# Patient Record
Sex: MALE | Race: WHITE | Employment: OTHER | URBAN - METROPOLITAN AREA
[De-identification: names, ages, dates, MRNs, and addresses within clinical notes are randomized per-mention and may not be internally consistent; named-entity substitution may affect disease eponyms.]

---

## 2017-02-28 ENCOUNTER — COMMUNICATION - HEALTHEAST (OUTPATIENT)
Dept: BEHAVIORAL HEALTH | Facility: CLINIC | Age: 57
End: 2017-02-28

## 2017-02-28 DIAGNOSIS — F06.30 MOOD DISORDER IN CONDITIONS CLASSIFIED ELSEWHERE: ICD-10-CM

## 2017-03-21 ENCOUNTER — OFFICE VISIT - HEALTHEAST (OUTPATIENT)
Dept: BEHAVIORAL HEALTH | Facility: CLINIC | Age: 57
End: 2017-03-21

## 2017-03-21 DIAGNOSIS — F60.9 PERSONALITY DISORDER (H): ICD-10-CM

## 2017-03-21 DIAGNOSIS — S09.90XS DEMENTIA DUE TO HEAD TRAUMA (H): ICD-10-CM

## 2017-03-21 DIAGNOSIS — F06.30 MOOD DISORDER DUE TO OLD HEAD TRAUMA: ICD-10-CM

## 2017-03-21 DIAGNOSIS — F31.9 BIPOLAR 1 DISORDER, DEPRESSED (H): ICD-10-CM

## 2017-03-21 DIAGNOSIS — F02.80 DEMENTIA DUE TO HEAD TRAUMA (H): ICD-10-CM

## 2017-03-21 DIAGNOSIS — S09.90XS MOOD DISORDER DUE TO OLD HEAD TRAUMA: ICD-10-CM

## 2017-08-08 ENCOUNTER — OFFICE VISIT - HEALTHEAST (OUTPATIENT)
Dept: BEHAVIORAL HEALTH | Facility: CLINIC | Age: 57
End: 2017-08-08

## 2017-08-08 DIAGNOSIS — F31.9 BIPOLAR 1 DISORDER, DEPRESSED (H): ICD-10-CM

## 2017-08-08 DIAGNOSIS — S09.90XS MOOD DISORDER DUE TO OLD HEAD TRAUMA: ICD-10-CM

## 2017-08-08 DIAGNOSIS — F06.30 MOOD DISORDER IN CONDITIONS CLASSIFIED ELSEWHERE: ICD-10-CM

## 2017-08-08 DIAGNOSIS — S09.90XS MOOD DISORDER DUE TO OLD HEAD INJURY: ICD-10-CM

## 2017-08-08 DIAGNOSIS — F06.30 MOOD DISORDER DUE TO OLD HEAD INJURY: ICD-10-CM

## 2017-08-08 DIAGNOSIS — S09.90XS DEMENTIA DUE TO HEAD TRAUMA (H): ICD-10-CM

## 2017-08-08 DIAGNOSIS — F60.9 PERSONALITY DISORDER (H): ICD-10-CM

## 2017-08-08 DIAGNOSIS — F06.30 MOOD DISORDER DUE TO OLD HEAD TRAUMA: ICD-10-CM

## 2017-08-08 DIAGNOSIS — F02.80 DEMENTIA DUE TO HEAD TRAUMA (H): ICD-10-CM

## 2017-09-26 ENCOUNTER — COMMUNICATION - HEALTHEAST (OUTPATIENT)
Dept: BEHAVIORAL HEALTH | Facility: CLINIC | Age: 57
End: 2017-09-26

## 2017-11-07 ENCOUNTER — COMMUNICATION - HEALTHEAST (OUTPATIENT)
Dept: BEHAVIORAL HEALTH | Facility: CLINIC | Age: 57
End: 2017-11-07

## 2017-11-29 ENCOUNTER — COMMUNICATION - HEALTHEAST (OUTPATIENT)
Dept: BEHAVIORAL HEALTH | Facility: CLINIC | Age: 57
End: 2017-11-29

## 2017-12-28 ENCOUNTER — COMMUNICATION - HEALTHEAST (OUTPATIENT)
Dept: BEHAVIORAL HEALTH | Facility: CLINIC | Age: 57
End: 2017-12-28

## 2017-12-28 DIAGNOSIS — F06.30 MOOD DISORDER IN CONDITIONS CLASSIFIED ELSEWHERE: ICD-10-CM

## 2018-02-06 ENCOUNTER — OFFICE VISIT - HEALTHEAST (OUTPATIENT)
Dept: BEHAVIORAL HEALTH | Facility: CLINIC | Age: 58
End: 2018-02-06

## 2018-02-13 ENCOUNTER — COMMUNICATION - HEALTHEAST (OUTPATIENT)
Dept: BEHAVIORAL HEALTH | Facility: CLINIC | Age: 58
End: 2018-02-13

## 2018-02-13 DIAGNOSIS — F06.30 MOOD DISORDER IN CONDITIONS CLASSIFIED ELSEWHERE: ICD-10-CM

## 2018-03-12 ENCOUNTER — COMMUNICATION - HEALTHEAST (OUTPATIENT)
Dept: BEHAVIORAL HEALTH | Facility: CLINIC | Age: 58
End: 2018-03-12

## 2018-03-13 ENCOUNTER — OFFICE VISIT - HEALTHEAST (OUTPATIENT)
Dept: BEHAVIORAL HEALTH | Facility: CLINIC | Age: 58
End: 2018-03-13

## 2018-03-13 DIAGNOSIS — F02.80 DEMENTIA DUE TO HEAD TRAUMA (H): ICD-10-CM

## 2018-03-13 DIAGNOSIS — F31.9 BIPOLAR 1 DISORDER, DEPRESSED (H): ICD-10-CM

## 2018-03-13 DIAGNOSIS — Z79.899 ENCOUNTER FOR LONG-TERM (CURRENT) USE OF MEDICATIONS: ICD-10-CM

## 2018-03-13 DIAGNOSIS — F06.30 MOOD DISORDER DUE TO OLD HEAD TRAUMA: ICD-10-CM

## 2018-03-13 DIAGNOSIS — F06.30 MOOD DISORDER IN CONDITIONS CLASSIFIED ELSEWHERE: ICD-10-CM

## 2018-03-13 DIAGNOSIS — S09.90XS MOOD DISORDER DUE TO OLD HEAD TRAUMA: ICD-10-CM

## 2018-03-13 DIAGNOSIS — S09.90XS DEMENTIA DUE TO HEAD TRAUMA (H): ICD-10-CM

## 2018-03-13 RX ORDER — POLYETHYLENE GLYCOL 3350 17 G/17G
17 POWDER, FOR SOLUTION ORAL DAILY
Status: SHIPPED | COMMUNITY
Start: 2018-03-13

## 2018-05-15 ENCOUNTER — COMMUNICATION - HEALTHEAST (OUTPATIENT)
Dept: BEHAVIORAL HEALTH | Facility: CLINIC | Age: 58
End: 2018-05-15

## 2018-05-15 DIAGNOSIS — F06.30 MOOD DISORDER IN CONDITIONS CLASSIFIED ELSEWHERE: ICD-10-CM

## 2018-05-22 ENCOUNTER — COMMUNICATION - HEALTHEAST (OUTPATIENT)
Dept: BEHAVIORAL HEALTH | Facility: CLINIC | Age: 58
End: 2018-05-22

## 2018-05-22 DIAGNOSIS — F06.30 MOOD DISORDER IN CONDITIONS CLASSIFIED ELSEWHERE: ICD-10-CM

## 2018-09-19 ENCOUNTER — COMMUNICATION - HEALTHEAST (OUTPATIENT)
Dept: BEHAVIORAL HEALTH | Facility: CLINIC | Age: 58
End: 2018-09-19

## 2018-09-19 DIAGNOSIS — F06.30 MOOD DISORDER IN CONDITIONS CLASSIFIED ELSEWHERE: ICD-10-CM

## 2018-09-19 DIAGNOSIS — S09.90XS MOOD DISORDER DUE TO OLD HEAD TRAUMA: ICD-10-CM

## 2018-09-19 DIAGNOSIS — F06.30 MOOD DISORDER DUE TO OLD HEAD TRAUMA: ICD-10-CM

## 2018-09-26 ENCOUNTER — OFFICE VISIT - HEALTHEAST (OUTPATIENT)
Dept: BEHAVIORAL HEALTH | Facility: CLINIC | Age: 58
End: 2018-09-26

## 2018-09-26 DIAGNOSIS — S09.90XS MOOD DISORDER DUE TO OLD HEAD TRAUMA: ICD-10-CM

## 2018-09-26 DIAGNOSIS — F06.30 MOOD DISORDER DUE TO OLD HEAD TRAUMA: ICD-10-CM

## 2018-09-26 DIAGNOSIS — F02.80 DEMENTIA DUE TO HEAD TRAUMA (H): ICD-10-CM

## 2018-09-26 DIAGNOSIS — F31.9 BIPOLAR 1 DISORDER, DEPRESSED (H): ICD-10-CM

## 2018-09-26 DIAGNOSIS — S09.90XS DEMENTIA DUE TO HEAD TRAUMA (H): ICD-10-CM

## 2018-10-29 ENCOUNTER — COMMUNICATION - HEALTHEAST (OUTPATIENT)
Dept: BEHAVIORAL HEALTH | Facility: CLINIC | Age: 58
End: 2018-10-29

## 2018-10-29 DIAGNOSIS — F06.30 MOOD DISORDER IN CONDITIONS CLASSIFIED ELSEWHERE: ICD-10-CM

## 2019-03-15 ENCOUNTER — COMMUNICATION - HEALTHEAST (OUTPATIENT)
Dept: BEHAVIORAL HEALTH | Facility: CLINIC | Age: 59
End: 2019-03-15

## 2019-03-15 DIAGNOSIS — F06.30 MOOD DISORDER DUE TO OLD HEAD TRAUMA: ICD-10-CM

## 2019-03-15 DIAGNOSIS — F06.30 MOOD DISORDER IN CONDITIONS CLASSIFIED ELSEWHERE: ICD-10-CM

## 2019-03-15 DIAGNOSIS — S09.90XS MOOD DISORDER DUE TO OLD HEAD TRAUMA: ICD-10-CM

## 2019-03-20 ENCOUNTER — OFFICE VISIT - HEALTHEAST (OUTPATIENT)
Dept: BEHAVIORAL HEALTH | Facility: CLINIC | Age: 59
End: 2019-03-20

## 2019-03-20 DIAGNOSIS — S09.90XS MOOD DISORDER DUE TO OLD HEAD TRAUMA: ICD-10-CM

## 2019-03-20 DIAGNOSIS — F06.30 MOOD DISORDER DUE TO OLD HEAD INJURY: ICD-10-CM

## 2019-03-20 DIAGNOSIS — F06.30 MOOD DISORDER IN CONDITIONS CLASSIFIED ELSEWHERE: ICD-10-CM

## 2019-03-20 DIAGNOSIS — F31.9 BIPOLAR 1 DISORDER, DEPRESSED (H): ICD-10-CM

## 2019-03-20 DIAGNOSIS — F06.30 MOOD DISORDER DUE TO OLD HEAD TRAUMA: ICD-10-CM

## 2019-03-20 DIAGNOSIS — S09.90XS MOOD DISORDER DUE TO OLD HEAD INJURY: ICD-10-CM

## 2019-04-04 ENCOUNTER — COMMUNICATION - HEALTHEAST (OUTPATIENT)
Dept: BEHAVIORAL HEALTH | Facility: CLINIC | Age: 59
End: 2019-04-04

## 2019-08-14 ENCOUNTER — OFFICE VISIT - HEALTHEAST (OUTPATIENT)
Dept: BEHAVIORAL HEALTH | Facility: CLINIC | Age: 59
End: 2019-08-14

## 2019-08-14 DIAGNOSIS — F06.30 MOOD DISORDER IN CONDITIONS CLASSIFIED ELSEWHERE: ICD-10-CM

## 2019-08-14 DIAGNOSIS — F06.30 MOOD DISORDER DUE TO OLD HEAD TRAUMA: ICD-10-CM

## 2019-08-14 DIAGNOSIS — F31.9 BIPOLAR 1 DISORDER, DEPRESSED (H): ICD-10-CM

## 2019-08-14 DIAGNOSIS — F60.9 PERSONALITY DISORDER (H): ICD-10-CM

## 2019-08-14 DIAGNOSIS — S09.90XS MOOD DISORDER DUE TO OLD HEAD TRAUMA: ICD-10-CM

## 2020-02-23 ENCOUNTER — COMMUNICATION - HEALTHEAST (OUTPATIENT)
Dept: BEHAVIORAL HEALTH | Facility: CLINIC | Age: 60
End: 2020-02-23

## 2020-02-23 DIAGNOSIS — F06.30 MOOD DISORDER IN CONDITIONS CLASSIFIED ELSEWHERE: ICD-10-CM

## 2020-02-24 ENCOUNTER — COMMUNICATION - HEALTHEAST (OUTPATIENT)
Dept: BEHAVIORAL HEALTH | Facility: CLINIC | Age: 60
End: 2020-02-24

## 2020-02-24 DIAGNOSIS — F06.30 MOOD DISORDER IN CONDITIONS CLASSIFIED ELSEWHERE: ICD-10-CM

## 2020-04-01 ENCOUNTER — OFFICE VISIT - HEALTHEAST (OUTPATIENT)
Dept: BEHAVIORAL HEALTH | Facility: CLINIC | Age: 60
End: 2020-04-01

## 2020-04-01 DIAGNOSIS — F06.30 MOOD DISORDER DUE TO OLD HEAD TRAUMA: ICD-10-CM

## 2020-04-01 DIAGNOSIS — F43.23 ADJUSTMENT DISORDER WITH MIXED ANXIETY AND DEPRESSED MOOD: ICD-10-CM

## 2020-04-01 DIAGNOSIS — S09.90XS MOOD DISORDER DUE TO OLD HEAD TRAUMA: ICD-10-CM

## 2020-04-01 DIAGNOSIS — S09.90XS DEMENTIA DUE TO HEAD TRAUMA WITH BEHAVIORAL DISTURBANCE (H): ICD-10-CM

## 2020-04-01 DIAGNOSIS — F02.818 DEMENTIA DUE TO HEAD TRAUMA WITH BEHAVIORAL DISTURBANCE (H): ICD-10-CM

## 2020-04-17 ENCOUNTER — COMMUNICATION - HEALTHEAST (OUTPATIENT)
Dept: BEHAVIORAL HEALTH | Facility: CLINIC | Age: 60
End: 2020-04-17

## 2020-04-17 DIAGNOSIS — F41.9 ANXIETY: ICD-10-CM

## 2020-04-27 ENCOUNTER — COMMUNICATION - HEALTHEAST (OUTPATIENT)
Dept: BEHAVIORAL HEALTH | Facility: CLINIC | Age: 60
End: 2020-04-27

## 2020-04-27 DIAGNOSIS — S09.90XS MOOD DISORDER DUE TO OLD HEAD TRAUMA: ICD-10-CM

## 2020-04-27 DIAGNOSIS — F06.30 MOOD DISORDER DUE TO OLD HEAD TRAUMA: ICD-10-CM

## 2020-06-30 ENCOUNTER — COMMUNICATION - HEALTHEAST (OUTPATIENT)
Dept: BEHAVIORAL HEALTH | Facility: CLINIC | Age: 60
End: 2020-06-30

## 2020-07-08 ENCOUNTER — COMMUNICATION - HEALTHEAST (OUTPATIENT)
Dept: BEHAVIORAL HEALTH | Facility: CLINIC | Age: 60
End: 2020-07-08

## 2020-08-18 ENCOUNTER — COMMUNICATION - HEALTHEAST (OUTPATIENT)
Dept: BEHAVIORAL HEALTH | Facility: CLINIC | Age: 60
End: 2020-08-18

## 2020-08-18 DIAGNOSIS — F06.30 MOOD DISORDER IN CONDITIONS CLASSIFIED ELSEWHERE: ICD-10-CM

## 2020-08-27 ENCOUNTER — COMMUNICATION - HEALTHEAST (OUTPATIENT)
Dept: BEHAVIORAL HEALTH | Facility: CLINIC | Age: 60
End: 2020-08-27

## 2020-08-27 DIAGNOSIS — F06.30 MOOD DISORDER IN CONDITIONS CLASSIFIED ELSEWHERE: ICD-10-CM

## 2020-09-15 ENCOUNTER — COMMUNICATION - HEALTHEAST (OUTPATIENT)
Dept: BEHAVIORAL HEALTH | Facility: CLINIC | Age: 60
End: 2020-09-15

## 2020-09-21 ENCOUNTER — COMMUNICATION - HEALTHEAST (OUTPATIENT)
Dept: BEHAVIORAL HEALTH | Facility: CLINIC | Age: 60
End: 2020-09-21

## 2020-09-30 ENCOUNTER — OFFICE VISIT - HEALTHEAST (OUTPATIENT)
Dept: BEHAVIORAL HEALTH | Facility: CLINIC | Age: 60
End: 2020-09-30

## 2020-09-30 DIAGNOSIS — S09.90XS MOOD DISORDER DUE TO OLD HEAD TRAUMA: ICD-10-CM

## 2020-09-30 DIAGNOSIS — S09.90XS DEMENTIA DUE TO HEAD TRAUMA WITH BEHAVIORAL DISTURBANCE (H): ICD-10-CM

## 2020-09-30 DIAGNOSIS — F06.30 MOOD DISORDER DUE TO OLD HEAD TRAUMA: ICD-10-CM

## 2020-09-30 DIAGNOSIS — F43.23 ADJUSTMENT DISORDER WITH MIXED ANXIETY AND DEPRESSED MOOD: ICD-10-CM

## 2020-09-30 DIAGNOSIS — F31.9 BIPOLAR 1 DISORDER, DEPRESSED (H): ICD-10-CM

## 2020-09-30 DIAGNOSIS — F02.818 DEMENTIA DUE TO HEAD TRAUMA WITH BEHAVIORAL DISTURBANCE (H): ICD-10-CM

## 2021-02-26 ENCOUNTER — COMMUNICATION - HEALTHEAST (OUTPATIENT)
Dept: BEHAVIORAL HEALTH | Facility: CLINIC | Age: 61
End: 2021-02-26

## 2021-02-26 DIAGNOSIS — F06.30 MOOD DISORDER IN CONDITIONS CLASSIFIED ELSEWHERE: ICD-10-CM

## 2021-03-01 RX ORDER — CLONAZEPAM 1 MG/1
TABLET ORAL
Qty: 60 TABLET | Refills: 5 | Status: SHIPPED | OUTPATIENT
Start: 2021-03-01 | End: 2021-09-09

## 2021-03-02 ENCOUNTER — COMMUNICATION - HEALTHEAST (OUTPATIENT)
Dept: BEHAVIORAL HEALTH | Facility: CLINIC | Age: 61
End: 2021-03-02

## 2021-03-09 ENCOUNTER — COMMUNICATION - HEALTHEAST (OUTPATIENT)
Dept: BEHAVIORAL HEALTH | Facility: CLINIC | Age: 61
End: 2021-03-09

## 2021-03-09 DIAGNOSIS — F06.30 MOOD DISORDER IN CONDITIONS CLASSIFIED ELSEWHERE: ICD-10-CM

## 2021-03-12 ENCOUNTER — COMMUNICATION - HEALTHEAST (OUTPATIENT)
Dept: BEHAVIORAL HEALTH | Facility: CLINIC | Age: 61
End: 2021-03-12

## 2021-03-12 RX ORDER — SERTRALINE HYDROCHLORIDE 100 MG/1
TABLET, FILM COATED ORAL
Qty: 45 TABLET | Refills: 11 | Status: SHIPPED | OUTPATIENT
Start: 2021-03-12 | End: 2022-02-18

## 2021-03-31 ENCOUNTER — OFFICE VISIT - HEALTHEAST (OUTPATIENT)
Dept: BEHAVIORAL HEALTH | Facility: CLINIC | Age: 61
End: 2021-03-31

## 2021-03-31 DIAGNOSIS — F41.9 ANXIETY: ICD-10-CM

## 2021-03-31 DIAGNOSIS — Z79.899 ENCOUNTER FOR LONG-TERM (CURRENT) USE OF MEDICATIONS: ICD-10-CM

## 2021-03-31 DIAGNOSIS — F06.30 MOOD DISORDER DUE TO OLD HEAD TRAUMA: ICD-10-CM

## 2021-03-31 DIAGNOSIS — S09.90XS MOOD DISORDER DUE TO OLD HEAD TRAUMA: ICD-10-CM

## 2021-03-31 RX ORDER — PROPRANOLOL HYDROCHLORIDE 20 MG/1
20 TABLET ORAL 3 TIMES DAILY
Qty: 270 TABLET | Refills: 3 | Status: SHIPPED | OUTPATIENT
Start: 2021-03-31

## 2021-03-31 RX ORDER — QUETIAPINE FUMARATE 100 MG/1
100 TABLET, FILM COATED ORAL 2 TIMES DAILY
Qty: 62 TABLET | Refills: 11 | Status: SHIPPED | OUTPATIENT
Start: 2021-03-31

## 2021-05-27 NOTE — TELEPHONE ENCOUNTER
RECEIVED PHYSICIAN'S ORDER FOR REVIEW AND SIGNATUREFROM [facility] - [facility] PROVIDER'S CLIN[facility] MAILBOX

## 2021-05-31 NOTE — PROGRESS NOTES
Correct pharmacy verified with patient and confirmed in snapshot? [x] yes []no    Charge captured ? [x] yes  [] no    Medications Phoned  to Pharmacy [] yes [x]no  Name of Pharmacist:  List Medications, including dose, quantity and instructions      Medication Prescriptions given to patient   [] yes  [x] no   List the name of the drug the prescription was written for.       Medications ordered this visit were e-scribed.  Verified by order class [x] yes  [] no  Klonopin and Zoloft  Medication changes or discontinuations were communicated to patient's pharmacy: [] yes  [x] no    UA collected [] yes  [x] no    [place] Prescription Monitoring Program Reviewed? [x] yes  [] no    Referrals were made to:  none  Future appointment was made: [x] yes  [] no  11/19/19  Dictation completed at time of chart check: [x] yes  [] no    I have checked the documentation for today s encounters and the above information has been reviewed and completed.
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
## 2021-05-31 NOTE — PATIENT INSTRUCTIONS - HE
1.Patient will take the medications as prescribed.  Zoloft 150 mg po ever am  Seroquel 100 mg 2 times daily  Klonopin 1 mg 2 times daily    2.Group home staff will call with any problems between 2 visits.  3.retirement staff will take the patient to the emergency room if not feeling safe , unable to function in the community, or if suicidal, homicidal or hearing voices or having paranoia.  4.Group home staff will watch his diet and exercise.  5.Group home staff will take the patient to see non psychiatric providers for non psychiatric disorders.  6.Next appointment in 3months.

## 2021-06-01 VITALS — HEIGHT: 72 IN

## 2021-06-02 VITALS — HEIGHT: 72 IN

## 2021-06-06 NOTE — TELEPHONE ENCOUNTER
Scheduling: please contact group home to schedule with Dr. Yeh at [facility]    Date of Last Office Visit: 8/14/2019  Date of Next Office Visit: None. Central scheduling attempting to contact pt  No shows since last visit: x 1  Cancellations since last visit: x 2 - both provider initiated  ED visits since last visit: none    Medication Zoloft 100 mg date last ordered: 8/14/2019  Qty: 45  Refills: 5    sertraline (ZOLOFT) 100 MG tablet 45 tablet 5 8/14/2019  No   Sig - Route: Take 1.5 tablets (150 mg total) by mouth daily. - Oral       Lapse in therapy greater than 7 days: appears yes  Medication refill request verified as identical to current order: Yes except asking for 11 RF  Result of Last DAM, VPA, Li+ Level, CBC, or Carbamazepine Level (at or since last visit): N/A        []Eligibility - not seen in last year    [x]Supervision - no future appointment    [x]Compliance: possible out of meds ; N/S x 1    []Verification - order discrepancy    []Controlled Medication    []90 - day supply request    [x]Other LPN pending medications    Current Medication list:    alendronate (FOSAMAX) 70 MG tablet Take 70 mg by mouth every 7 days. Take in the morning on an empty stomach with a full glass of water 30 minutes before food   bisacodyl 5 mg Tab Take 5 mg by mouth bedtime.   calcium-vitamin D (CALCIUM-VITAMIN D) 500 mg(1,250mg) -200 unit per tablet Take 1 tablet by mouth daily.   cholecalciferol, vitamin D3, 1,000 unit tablet Take 1,000 Units by mouth daily.   clonazePAM (KLONOPIN) 1 MG tablet Take 1 tablet (1 mg total) by mouth 2 (two) times a day.   dutasteride (AVODART) 0.5 mg capsule Take 0.5 mg by mouth bedtime.   hydrOXYzine (ATARAX) 25 MG tablet Take 25 mg by mouth 4 (four) times a day as needed for itching.   loperamide (IMODIUM A-D) 2 mg tablet Take 2 mg by mouth every 8 (eight) hours as needed for diarrhea.   polyethylene glycol (MIRALAX) 17 gram packet Take 17 g by mouth daily.   PROPRANOLOL HCL (PROPRANOLOL  ORAL) Take 20 mg by mouth 3 (three) times a day.    QUEtiapine (SEROQUEL) 100 MG tablet TAKE 1 TABLET BY MOUTH TWICE DAILY   sertraline (ZOLOFT) 100 MG tablet Take 1.5 tablets (150 mg total) by mouth daily.   simvastatin (ZOCOR) 20 MG tablet Take 20 mg by mouth bedtime.   ZINC OXIDE (BOUDREAUXS BUTT PASTE TOP) Apply 1 application topically every 4 (four) hours as needed (to rectal area).         Medication Plan of Care at last office visit with MD/CNP:    PLAN:  1.Patient will take the medications as prescribed.  Zoloft 150 mg po ever am  Seroquel 100 mg 2 times daily  Klonopin 1 mg 2 times daily    [place], [place], [place], and [place] : NA

## 2021-06-07 NOTE — TELEPHONE ENCOUNTER
Pharmacy requesting refill for Propranolol 20 mg three times a day. Ins is asking to convert this order to a 90 day supply. This med is listed as historical and no previous prescriptions found.   Phone call made to [facility] and spoke to Dr. Ruba Lucas approved this med on 5/1/2019 ( see Physician's order under media ) and according to staff pt is due for refill.   Last seen ( telephone visit) on 4/1/2020    Plan     1.Patient will take the medications as prescribed.  Zoloft 150 mg po ever am  Seroquel 100 mg 2 times daily  Klonopin 1 mg 2 times daily     Please review and prescribe if appropriate.

## 2021-06-07 NOTE — TELEPHONE ENCOUNTER
Already spoke to [facility] ( see previous note)  Order for Propranolol 20 mg three times a day pended for provider to review and sign.

## 2021-06-07 NOTE — PROGRESS NOTES
"Telephone Outpatient Psychiatric  Progress Note    Date of visit: 4/1/2020    Dominik Love is a 60 y.o. male who is being evaluated via a billable telephone visit.      The patient has been notified of following:     \"This telephone visit will be conducted via a call between you and your physician/provider. We have found that certain health care needs can be provided without the need for a physical exam.  This service lets us provide the care you need with a short phone conversation.  If a prescription is necessary we can send it directly to your pharmacy.  If lab work is needed we can place an order for that and you can then stop by our lab to have the test done at a later time.    If during the course of the call the physician/provider feels a telephone visit is not appropriate, you will not be charged for this service.\"     Patient has given verbal consent to a Telephone visit? yes    Dominik Love complains of fear of the virus     I have reviewed and updated the patient's Past Medical History, Social History, Family History and Medication List.    ALLERGIES  Adhesive tape-silicones; Other drug allergy (see comments); Quinolones; Septra [sulfamethoxazole-trimethoprim]; Sulfa (sulfonamide antibiotics); and Trimethoprim    Additional provider notes:This visit is conducted via [facility]one due to social distancing of barrera virus . Dominik with present with is  staff .He denies suicidal and homicidal ideas,He says he is afraid if the virus, even though he does not know much about it..   He has not left  house x3 weeks due to Corona virus .He went to his home for [place]. He does not get to Day Tx. It is closed.He actually gets along with other people.Staff finds a way to occupy their mind.  Sleep is reasonable. He wakes up and he falls asleep. . Appetite ok. Energy is reasonable. Concentration is at baseline.   He denies physical problems. No side effects of medication.His brother and his mother are involved in " his life.  He washes his hands.  He does not have fever, dry cough, headache or muscle ache.  Staff is closely monitoring for that.  Staff says that they are watching over the clients because if 1 would get sick all of them would be quarantined.    Mental status examination today:  General: cooperative  Alert and oriented: Disoriented in time, oriented to to self  Speech: Impaired articulation due to traumatic brain injury  Thought process: concrete  Thought content: denies  delusions and hallucinations   Associations: Connected  Suicidal thoughts: denies   Homicidal thoughts: denies  Affect: anxious   Mood: depressed   Intellectual functioning: Impaired due to TBI  Memory: Impaired due to TBI  Fund of knowledge: Impaired due to TBI  Attention  and concentration: At his baseline which is below normal limits   Gait: cannot assess because this is phone visit  Psychomotor activity: agitation in his voice  Muscles strength and tone: can not assess because this is phone visit, but from previous visit, there is atrophy of lower extremities due to lack of use.     InSight and judgment: Limited ,he has a guardian    Medication adherence: compliant  Medication side effects: absent  Information about medications: Side effects, benefits and alternative treatments discussed with group home staff     Psychotherapy: Basic supportive therapy about coping with corona virus     Education: Diet and exercise discussed with his group home staff and they will help the patient with that.      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 single white male resident of a group home.  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  His mother is his guardian.  He is struggling with coping with stress of corona virus. Staff is helping  They do not expect medication changes.  They will closely monitor him.  They will call if there is worsening of symptoms.  The group home staff will follow these recommendations:    1.Patient will take the medications as prescribed.  Zoloft 150 mg po ever am  Seroquel 100 mg 2 times daily  Klonopin 1 mg 2 times daily    2.Group home staff will call with any problems between 2 visits.  3.senior care staff will take the patient to the emergency room if not feeling safe , unable to function in the community, or if suicidal, homicidal or hearing voices or having paranoia.  4.Group home staff will watch his diet and exercise.  5.Group home staff will take the patient to see non psychiatric providers for non psychiatric disorders.  6.Next appointment in 3months.    Phone call duration:  24 minutes    Corrie Yeh MD

## 2021-06-07 NOTE — TELEPHONE ENCOUNTER
Date of Last Office Visit: 4/1/20  Date of Next Office Visit: 7/8/20  No shows since last visit: none  Cancellations since last visit: none  ED visits since last visit:  none  Medication QUEtiapine (SEROQUEL) 100 MG tablet  date last ordered: 3/15/19  Qty: 62  Refills: 11  Lapse in therapy greater than 7 days: yes  Medication refill request verified as identical to current order: yes  Result of Last DAM, VPA, Li+ Level, CBC, or Carbamazepine Level (at or since last visit): N/A     [] Medication refilled per [facility] M-1.   [x] Medication unable to be refilled by RN due to criteria not met as indicated below:     []Eligibility - not seen in last year    []Supervision - no future appointment    [x]Compliance     []Verification - order discrepancy    []Controlled Medication    []Medication not included in RN Protocol    []90 - day supply request    []Other   Current Medication list:   alendronate (FOSAMAX) 70 MG tablet  Take 70 mg by mouth every 7 days. Take in the morning on an empty stomach with a full glass of water 30 minutes before food    bisacodyl 5 mg Tab  Take 5 mg by mouth bedtime.    calcium-vitamin D (CALCIUM-VITAMIN D) 500 mg(1,250mg) -200 unit per tablet  Take 1 tablet by mouth daily.    cholecalciferol, vitamin D3, 1,000 unit tablet  Take 1,000 Units by mouth daily.    clonazePAM (KLONOPIN) 1 MG tablet  TAKE 1 TABLET BY MOUTH TWICE DAILY *6 TOTAL FILLS* *ORDER WHEN LOW*    dutasteride (AVODART) 0.5 mg capsule  Take 0.5 mg by mouth bedtime.    hydrOXYzine (ATARAX) 25 MG tablet  Take 25 mg by mouth 4 (four) times a day as needed for itching.    loperamide (IMODIUM A-D) 2 mg tablet  Take 2 mg by mouth every 8 (eight) hours as needed for diarrhea.    polyethylene glycol (MIRALAX) 17 gram packet  Take 17 g by mouth daily.    propranoloL (INDERAL) 20 MG tablet  Take 1 tablet (20 mg total) by mouth 3 (three) times a day.    PROPRANOLOL HCL (PROPRANOLOL ORAL)  Take 20 mg by mouth 3 (three) times a day.    QUEtiapine  (SEROQUEL) 100 MG tablet  TAKE 1 TABLET BY MOUTH TWICE DAILY    sertraline (ZOLOFT) 100 MG tablet  TAKE ONE AND ONE-HALF TABLETS (150MG) BY MOUTH ONCE DAILY    simvastatin (ZOCOR) 20 MG tablet  Take 20 mg by mouth bedtime.    ZINC OXIDE (BOUDREAUXS BUTT PASTE TOP)  Apply 1 application topically every 4 (four) hours as needed (to rectal area).         Medication Plan of Care at last office visit with MD/CNP:    1.Patient will take the medications as prescribed.  Zoloft 150 mg po ever am  Seroquel 100 mg 2 times daily  Klonopin 1 mg 2 times daily

## 2021-06-09 NOTE — TELEPHONE ENCOUNTER
Group home called regarding clonazepan 1mg. Group home staff stated this medication was not given last night and wanted to let Dr. Yeh know a medication was missed.
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
## 2021-06-09 NOTE — PROGRESS NOTES
Correct pharmacy verified with patient and confirmed in snapshot? [x] yes []no    Medications Phoned  to Pharmacy [] yes [x]no  Name of Pharmacist:  List Medications, including dose, quantity and instructions      Medication Prescriptions given to patient   [] yes  [x] no   List the name of the drug the prescription was written for.       Medications ordered this visit were e-scribed.  Verified by order class [] yes  [x] no    Medication changes or discontinuations were communicated to patient's pharmacy: [] yes  [x] no    UA collected [] yes    [x] no    [place] Prescription Monitoring Program Reviewed? [] yes  [x] no    Referrals were made to: none    Future appointment was made: [x] yes  [] no    Dictation completed at time of chart check: [] yes  [x] no    I have checked the documentation for today s encounters and the above information has been reviewed and completed.

## 2021-06-10 NOTE — TELEPHONE ENCOUNTER
[facility] requesting Klonopin refills be ordered in quantity of 62 tabs for 31 days for their automated ordering sytem.

## 2021-06-10 NOTE — TELEPHONE ENCOUNTER
Date of Last Office Visit: 4/1/2020  Date of Next Office Visit: 9/15/2020  No shows since last visit: 1  Cancellations since last visit:none  ED visits since last visit: none    Medication Clonazepam 1 mg date last ordered:2/25/2020  Qty: 60  Refills:5    Lapse in therapy greater than 7 days: no  Medication refill request verified as identical to current order: yes  Result of Last DAM, VPA, Li+ Level, CBC, or Carbamazepine Level (at or since last visit): n/a     [] Medication refilled per [facility].   [x] Medication unable to be refilled by RN due to criteria not met as indicated below:     []Eligibility - not seen in last year    []Supervision - no future appointment    []Compliance     []Verification - order discrepancy    []Controlled Medication    [x]Medication not included in RN Protocol    []90 - day supply request    [x]Other Refills requested  Current Medication list:  Dominik Love    (MRN [id number])   Your Current Medications Are     alendronate (FOSAMAX) 70 MG tablet  Take 70 mg by mouth every 7 days. Take in the morning on an empty stomach with a full glass of water 30 minutes before food    bisacodyl 5 mg Tab  Take 5 mg by mouth bedtime.    calcium-vitamin D (CALCIUM-VITAMIN D) 500 mg(1,250mg) -200 unit per tablet  Take 1 tablet by mouth daily.    cholecalciferol, vitamin D3, 1,000 unit tablet  Take 1,000 Units by mouth daily.    clonazePAM (KLONOPIN) 1 MG tablet  TAKE 1 TABLET BY MOUTH TWICE DAILY *6 TOTAL FILLS* *ORDER WHEN LOW*    dutasteride (AVODART) 0.5 mg capsule  Take 0.5 mg by mouth bedtime.    hydrOXYzine (ATARAX) 25 MG tablet  Take 25 mg by mouth 4 (four) times a day as needed for itching.    loperamide (IMODIUM A-D) 2 mg tablet  Take 2 mg by mouth every 8 (eight) hours as needed for diarrhea.    polyethylene glycol (MIRALAX) 17 gram packet  Take 17 g by mouth daily.    propranoloL (INDERAL) 20 MG tablet  Take 1 tablet (20 mg total) by mouth 3 (three) times a day.    PROPRANOLOL HCL  (PROPRANOLOL ORAL)  Take 20 mg by mouth 3 (three) times a day.    QUEtiapine (SEROQUEL) 100 MG tablet  TAKE 1 TABLET BY MOUTH TWICE DAILY    sertraline (ZOLOFT) 100 MG tablet  TAKE ONE AND ONE-HALF TABLETS (150MG) BY MOUTH ONCE DAILY    simvastatin (ZOCOR) 20 MG tablet  Take 20 mg by mouth bedtime.    ZINC OXIDE (BOUDREAUXS BUTT PASTE TOP)  Apply 1 application topically every 4 (four) hours as needed (to rectal area).        Medication Plan of Care at last office visit with MD/CNP:   1.Patient will take the medications as prescribed.  Zoloft 150 mg po ever am  Seroquel 100 mg 2 times daily  Klonopin 1 mg 2 times daily     2.Group home staff will call with any problems between 2 visits.  3.correction staff will take the patient to the emergency room if not feeling safe , unable to function in the community, or if suicidal, homicidal or hearing voices or having paranoia.  4.Group home staff will watch his diet and exercise.  5.Group home staff will take the patient to see non psychiatric providers for non psychiatric disorders.  6.Next appointment in 3months.

## 2021-06-10 NOTE — TELEPHONE ENCOUNTER
Need Klonopin supply order to be ordered for 31 days instead of 30 so it can be placed as automatic    Pleased fax to [phone]
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
## 2021-06-11 NOTE — PATIENT INSTRUCTIONS - HE
1.Patient will take the medications as prescribed.  Zoloft 150 mg po ever am  Seroquel 100 mg 2 times daily  Klonopin 1 mg 2 times daily    2.Group home staff will call with any problems between 2 visits.  3.retirement staff will take the patient to the emergency room if not feeling safe , unable to function in the community, or if suicidal, homicidal or hearing voices or having paranoia.  4.Group home staff will watch his diet and exercise.  5.Group home staff will take the patient to see non psychiatric providers for non psychiatric disorders.  6.Next appointment in 6 months.

## 2021-06-11 NOTE — TELEPHONE ENCOUNTER
Attempted to reach group home twice, LM to return call to triage to discuss medication error, see how the patient is doing and if they are experiencing any adverse effects from the error. Message also forwarded to Dr. Yeh.

## 2021-06-11 NOTE — TELEPHONE ENCOUNTER
Gh called to report a medication error. Simvastatin 20mg dose, was given 2 tablets by accident equalling 40mg. Sertraline 100 mg, dose given was half the dose of this medication. GH wanted to report medication error and receive recommendations. No side effect reported.

## 2021-06-11 NOTE — TELEPHONE ENCOUNTER
Called Group home and left message for Anibal to return call to triage.     Please transfer patient to triage when he returns the call.

## 2021-06-11 NOTE — TELEPHONE ENCOUNTER
Called patient home. Spoke with staff Anibal who stated that patient is asleep and will be up in an hour. Rn spoke with provider and let her know what the situation was and provider stated that patient should reschedule appointment. RN gave clinic number to patient staff Aniabl and asked him to reschedule patient appointment per provider's direcive. He agreed.

## 2021-06-11 NOTE — TELEPHONE ENCOUNTER
RN spoke with Anibal patient home staff and informed him of Dr. Yeh's directives and he said the group home had already called patient pcp. Anibal explained that patient has no symptoms/side effects that they have noticed. He stated that patient is doing well.

## 2021-06-12 NOTE — PROGRESS NOTES
Pt is here for psychiatric med management follow up and is accompanied by group home staff. Pt says he wants to die today. Client is hard to understand , has dementia due to head trauma.    Correct pharmacy verified with patient and confirmed in snapshot? [x] yes []no    Medications Phoned  to Pharmacy [] yes [x]no  Name of Pharmacist:  List Medications, including dose, quantity and instructions      Medication Prescriptions given to patient   [] yes  [x] no   List the name of the drug the prescription was written for.       Medications ordered this visit were e-scribed.  Verified by order class [x] yes  [] no  Klonopin, Seroquel, and Zoloft   Medication changes or discontinuations were communicated to patient's pharmacy: [] yes  [x] no    UA collected [] yes    [x] no    [place] Prescription Monitoring Program Reviewed? [] yes  [x] no    Referrals were made to: none    Future appointment was made: [x] yes  [] no  11/7/17  Dictation completed at time of chart check: [x] yes  [] no    I have checked the documentation for today s encounters and the above information has been reviewed and completed.
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
## 2021-06-15 NOTE — TELEPHONE ENCOUNTER
Medication pended to provider as high priority, see refill encounter dated 3/9/21. Waiting for provider response.

## 2021-06-15 NOTE — TELEPHONE ENCOUNTER
Date of Last Office Visit: 9/30/20  Date of Next Office Visit: 3/31/21  No shows since last visit: 0  Cancellations since last visit: 0    Medication requested: clonazepam 1 mg Date last ordered: 8/31/20 Qty: 62 Refills: 5     Review of [place] ?: Yes  Medication last filled date: 1/28/21 Qty filled: 56  Other controlled substance on [place] ?: No  If yes, is this a new medication?: N/Aa  If yes, name of medication: N/A and date filled: N/A    Lapse in medication adherence greater than 5 days?: No  If yes, call patient and gather details: N/A  Medication refill request verified as identical to current order?: Yes  Result of Last DAM, VPA, Li+ Level, CBC, or Carbamazepine Level (at or since last visit): N/A    []Medication refilled per  Medication Refill in Ambulatory Care  policy.  [x]Medication unable to be refilled by RN due to criteria not met as indicated below:    []Eligibility - not seen in the last year   []Supervision - no future appointment   []Compliance - no shows, cancellations or lapse in therapy   []Verification - order discrepancy   [x]Controlled medication   []Medication not included in policy   []90-day supply request   []Other

## 2021-06-15 NOTE — TELEPHONE ENCOUNTER
Pharmacy calls to get prescription for a 31 day refill of clonazePAM (KLONOPIN) 1 MG tablet    Pharmacy is [facility] - [place], [place] - [address] [place] (Ph: [phone])

## 2021-06-15 NOTE — TELEPHONE ENCOUNTER
Reason for call:  Medication If this is a refill request, has the caller requested the refill from the pharmacy already?   Yes  Will the patient be using a [facility]? No  Name of the pharmacy and phone number for the current request: [facility] [phone]    Name of the medication requested: clonazepam    Other request: They are requesting a 31 day ok.  Phone number to reach patient:    Any pharmacist at [phone]      Best Time:  ASAP    Can we leave a detailed message on this number? Yes

## 2021-06-15 NOTE — TELEPHONE ENCOUNTER
Date of Last Office Visit: 9-30-20  Date of Next Office Visit: 3-31-21  No shows since last visit: 0  Cancellations since last visit: 0    Medication requested: zoloft  Date last ordered: 2-24-20 Qty: 45 Refills: 11  Lapse in medication adherence greater than 5 days?: no    Medication refill request verified as identical to current order?: yes  Result of Last DAM, VPA, Li+ Level, CBC, or Carbamazepine Level (at or since last visit): N/A    []Medication refilled per  Medication Refill in Ambulatory Care  policy.  [x]Medication unable to be refilled by RN due to criteria not met as indicated below:    []Eligibility - not seen in the last year   []Supervision - no future appointment   []Compliance - no shows, cancellations or lapse in therapy   []Verification - order discrepancy   []Controlled medication   []Medication not included in policy   []90-day supply request   [x]Other  They are requesting 11 refills.

## 2021-06-15 NOTE — TELEPHONE ENCOUNTER
Reason for call:  Received call from Harinder ([facility]), inquiring about the reason for the denial for the prescription of Sertraline.     Additional comments: Per Harinder, please call her at [phone], if Pharmacy is not available at Pharmacy     Phone number to reach:  [phone]      Best Time: Anytime     Can we leave a detailed message on this number? Yes
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
## 2021-06-16 NOTE — PROGRESS NOTES
________________________________________  Medications Phoned  to Pharmacy [] yes [x]no  Name of Pharmacist:  List Medications, including dose, quantity and instructions    Medications ordered this visit were e-scribed.  Verified by order class [x] yes  [] no  propranolol 20 mg; Seroquel 100 mg   Medication changes or discontinuations were communicated to patient's pharmacy: [] yes  [x] no    Dictation completed at time of chart check: [x] yes  [] no    I have checked the documentation for today s encounters and the above information has been reviewed and completed.

## 2021-06-16 NOTE — PATIENT INSTRUCTIONS - HE
1.Patient will take the medications as prescribed.  Zoloft 150 mg po ever am for mood  Seroquel 100 mg 2 times daily for mood  Klonopin 1 mg 2 times daily for anxiety and agitation  Propranolol 20 mg 3 times daily for anxiety    2.Group home staff will call with any problems between 2 visits.  3.care home staff will take the patient to the emergency room if not feeling safe , unable to function in the community, or if suicidal, homicidal or hearing voices or having paranoia.  4.Group home staff will watch his diet and exercise.  5.Group home staff will take the patient to see non psychiatric providers for non psychiatric disorders.  6.Next appointment in 6 months.  7.  Check fasting lipids and glucose for monitoring Seroquel

## 2021-06-16 NOTE — TELEPHONE ENCOUNTER
Telephone Encounter by Whitney Valle LPN at 2/24/2020 11:27 AM     Author: Whitney Valle LPN Service: -- Author Type: Licensed Nurse    Filed: 2/24/2020 11:36 AM Encounter Date: 2/24/2020 Status: Signed    : Whitney Valle LPN (Licensed Nurse)       Date of Last Office Visit: 8/14/2019  Date of Next Office Visit: None - routed to scheduling  No shows since last visit: x 1  Cancellations since last visit: x 2 - both provider initiated  ED visits since last visit: none    Medication Klonopin 1 mg date last ordered: 8/14/2019  Qty: 60  Refills: 5    clonazePAM (KLONOPIN) 1 MG tablet 60 tablet 5 8/14/2019     Sig - Route: Take 1 tablet (1 mg total) by mouth 2 (two) times a day. - Oral        Lapse in therapy greater than 7 days: No. According to  last filled on 1/27/2020 for 30 days  Medication refill request verified as identical to current order: yes  Result of Last DAM, VPA, Li+ Level, CBC, or Carbamazepine Level (at or since last visit): N/A        []Eligibility - not seen in last year    [x]Supervision - no future appointment    [x]Compliance : N/S x 1    []Verification - order discrepancy    [x]Controlled Medication    []90 - day supply request    [x]Other LPN pending medications    Current Medication list:    alendronate (FOSAMAX) 70 MG tablet Take 70 mg by mouth every 7 days. Take in the morning on an empty stomach with a full glass of water 30 minutes before food   bisacodyl 5 mg Tab Take 5 mg by mouth bedtime.   calcium-vitamin D (CALCIUM-VITAMIN D) 500 mg(1,250mg) -200 unit per tablet Take 1 tablet by mouth daily.   cholecalciferol, vitamin D3, 1,000 unit tablet Take 1,000 Units by mouth daily.   clonazePAM (KLONOPIN) 1 MG tablet Take 1 tablet (1 mg total) by mouth 2 (two) times a day.   dutasteride (AVODART) 0.5 mg capsule Take 0.5 mg by mouth bedtime.   hydrOXYzine (ATARAX) 25 MG tablet Take 25 mg by mouth 4 (four) times a day as needed for itching.   loperamide  (IMODIUM A-D) 2 mg tablet Take 2 mg by mouth every 8 (eight) hours as needed for diarrhea.   polyethylene glycol (MIRALAX) 17 gram packet Take 17 g by mouth daily.   PROPRANOLOL HCL (PROPRANOLOL ORAL) Take 20 mg by mouth 3 (three) times a day.    QUEtiapine (SEROQUEL) 100 MG tablet TAKE 1 TABLET BY MOUTH TWICE DAILY   sertraline (ZOLOFT) 100 MG tablet Take 1.5 tablets (150 mg total) by mouth daily.   simvastatin (ZOCOR) 20 MG tablet Take 20 mg by mouth bedtime.   ZINC OXIDE (BOUDREAUXS BUTT PASTE TOP) Apply 1 application topically every 4 (four) hours as needed (to rectal area).         Medication Plan of Care at last office visit with MD/CNP:    PLAN:    1.Patient will take the medications as prescribed.  Zoloft 150 mg po ever am  Seroquel 100 mg 2 times daily  Klonopin 1 mg 2 times daily    6.Next appointment in 3months.      [place], [place], [place], and [place] :Reviewed and no worrisome pharmacy activity noted
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
## 2021-06-16 NOTE — PROGRESS NOTES
Pt is here for routine psychiatric med management follow up. Staff reports no concerns. DISCUS performed.    Correct pharmacy verified with patient and confirmed in snapshot? [x] yes []no    Charge captured ? [x] yes  [] no    Medications Phoned  to Pharmacy [] yes [x]no  Name of Pharmacist:  List Medications, including dose, quantity and instructions      Medication Prescriptions given to patient   [] yes  [x] no   List the name of the drug the prescription was written for.       Medications ordered this visit were e-scribed.  Verified by order class [x] yes  [] no  Seroquel and Zoloft  Medication changes or discontinuations were communicated to patient's pharmacy: [] yes  [x] no    UA collected [] yes  [x] no    [place] Prescription Monitoring Program Reviewed? [] yes  [x] no    Referrals were made to:  none    Future appointment was made: [x] yes  [] no  9/11/18  Dictation completed at time of chart check: [x] yes  [] no    I have checked the documentation for today s encounters and the above information has been reviewed and completed.

## 2021-06-20 NOTE — PROGRESS NOTES
Patient here today for follow up of medication management.       [facility] [place] [facility]: 18  Query Report Page#: 1  Patient Rx History Report  JANIA GARVEY  Search Criteria: Last Name 'jania' and First Name 'graciela' and  = ' and Request Period = ' to  ' - 2 out of 2 Recipients Selected.  Fill Date Product, Str, Form Qty Days Pt ID Prescriber Written RX# N/R* Pharm **MED+  ---------- -------------------------------- ------------ ---- --------- ---------- ---------- ------------ ----- --------- ------  2018 CLONAZEPAM 1 MG TABLET 60.665868 30 [id number] 2018 [id number] R [id number] 00.0  2018 CLONAZEPAM 1 MG TABLET 62.539649 31 [id number] 2018 [id number] R [id number] 00.0  2018 CLONAZEPAM 1 MG TABLET 62.048616 31 [id number] 2018 [id number] R [id number] 00.0  2018 CLONAZEPAM 1 MG TABLET 62.150455 31 [id number] 2018 [id number] R [id number] 00.0  2018 CLONAZEPAM 1 MG TABLET 60.573543 30 [id number] 2018 [id number] N [id number] 00.0  2018 CLONAZEPAM 1 MG TABLET 60.930218 30 [id number] 2018 [id number] N [id number] 00.0  2018 CLONAZEPAM 1 MG TABLET 47.472983 24 [id number] 2018 [id number] N [id number] 00.0  2018 CLONAZEPAM 1 MG TABLET 60.689287 30 [id number] 2018 [id number] R [id number] [facility]  2018 CLONAZEPAM 1 MG TABLET 60.755610 30 [id number] 2018 [id number] R [id number] [facility]  2018 CLONAZEPAM 1 MG TABLET 62.418593 31 [id number] 2018 [id number] N [id number] [facility]  2018 CLONAZEPAM 1 MG TABLET 62.330182 31 [id number] 2018 [id number] N [id number] [facility]  2018 CLONAZEPAM 1 MG TABLET 59.462664 30 [id number] 2018 [id number] N [id number] [facility]  01/15/2018 CLONAZEPAM 1 MG TABLET 61.135151 31 [id number] 2017 [id number] N [id number] [facility]  2017 CLONAZEPAM 1 MG TABLET 62.797426 31  [id number] 2017 [id number] R [id number] K  2017 CLONAZEPAM 1 MG TABLET 19.336831 9 [id number] 2017 [id number] R [id number] K  2017 CLONAZEPAM 1 MG TABLET 62.738099 31 [id number] 2017 [id number] R [id number] K  10/27/2017 CLONAZEPAM 1 MG TABLET 60.947099 30 [id number] 2017 [id number] R [id number] K  2017 CLONAZEPAM 1 MG TABLET 62.435084 31 [id number] 2017 [id number] R [id number] [facility]  *N/R N=New R=Refill  +MED Daily  Prescribers for prescriptions listed  ----------------------------------------------------------------------------------------------------------------------------------  [id number] NANDA FELDER MD; [address], [facility], [place] [address]  [id number] KENRICK SHAW MD; [address] [facility], , [facility] [address]  Pharmacies that dispensed prescriptions listed  ----------------------------------------------------------------------------------------------------------------------------------  [id number] [facility]; [address] [facility] [address],  Patients that match search criteria  ----------------------------------------------------------------------------------------------------------------------------------  [id number] PATRIC GARVEY,  64; [address] [facility] [address]  [id number] PATRIC GARVEY,  64; [facility] [address]  MED Summary  This section displays cumulative MED values by unique recipient. The MED Max value is the maximum occurrence of cumulative MED  **Per CDC guidance, the conversion factors and associated daily morphine milligram equivalents for drugs prescribed as part of  medication-assisted treatment for opioid use disorder should not be used to benchmark against dosage thresholds meant for opioids  prescribed for pain.  Report Disclaimers:  The report provided above is based upon the search criteria and the data  provided by the dispensing entities. For more information  about any prescription, please contact the dispenser or the prescriber.  This report contains confidential information, including patient identifiers, and is not a public record. The information on this  report must be treated as protected health information and is to be disclosed to others only as authorized by applicable state  and Federal regulations.  [facility] [place] Date: 09/26/18  Query Report Page#: 2  Patient Rx History Report  PATRIC GARVEY  sustained for any 3 consecutive days. This value is calculated based on prescriptions dispensed during the date range requested.  -----------------------------------------------------------------------------------------------------------------------------------  0 SPIVEY, MARE; 1960; [address], [facility] [address]  **Per CDC guidance, the conversion factors and associated daily morphine milligram equivalents for drugs prescribed as part of  medication-assisted treatment for opioid use disorder should not be used to benchmark against dosage thresholds meant for opioids  prescribed for pain.  Report Disclaimers:  The report provided above is based upon the search criteria and the data provided by the dispensing entities. For more information  about any prescription, please contact the dispenser or the prescriber.  This report contains confidential information, including patient identifiers, and is not a public record. The information on this  report must be treated as protected health information and is to be disclosed to others only as authorized by applicable state  and Federal regulations.    Correct pharmacy verified with patient and confirmed in snapshot? [x] yes []no    Charge captured ? [x] yes  [] no    Medications Phoned  to Pharmacy [] yes [x]no  Name of Pharmacist:  List Medications, including dose, quantity and instructions      Medication Prescriptions given to patient   []  yes  [x] no   List the name of the drug the prescription was written for.       Medications ordered this visit were e-scribed.  Verified by order class [] yes  [x] no    Medication changes or discontinuations were communicated to patient's pharmacy: [] yes  [x] no    UA collected [] yes  [x] no    [place] Prescription Monitoring Program Reviewed? [x] yes  [] no    Referrals were made to:      Future appointment was made: [x] yes  [] no    Dictation completed at time of chart check: [] yes  [x] no    I have checked the documentation for today s encounters and the above information has been reviewed and completed.
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
## 2021-06-25 NOTE — TELEPHONE ENCOUNTER
Date of Last Office Visit: 9/26/18  Date of Next Office Visit: 3/20/19  No shows since last visit: none  Cancellations since last visit: none  ED visits since last visit:  none  Medication quetiapine 100mg date last ordered: 9/21/18  Qty: 60  Refills: 5  Medication sertraline 100mg date last ordered: 9/21/18  Qty: 45  Refills: 5    Lapse in therapy greater than 7 days: No  Medication refill request verified as identical to current order: yes  Result of Last DAM, VPA, Li+ Level, CBC, or Carbamazepine Level (at or since last visit): N/A     [] Medication refilled per [facility] M[facility].   [] Medication unable to be refilled by RN due to criteria not met as indicated below:     []Eligibility - not seen in last year    []Supervision - no future appointment    []Compliance     []Verification - order discrepancy    []Controlled Medication    []Medication not included in RN Protocol    []90 - day supply request    [x]Other - Patient requesting refill early   Current Medication list:    alendronate (FOSAMAX) 70 MG tablet Take 70 mg by mouth every 7 days. Take in the morning on an empty stomach with a full glass of water 30 minutes before food   bisacodyl 5 mg Tab Take 5 mg by mouth bedtime.   calcium-vitamin D (CALCIUM-VITAMIN D) 500 mg(1,250mg) -200 unit per tablet Take 1 tablet by mouth daily.   cholecalciferol, vitamin D3, 1,000 unit tablet Take 1,000 Units by mouth daily.   clonazePAM (KLONOPIN) 1 MG tablet TAKE 1 TABLET BY MOUTH TWICE DAILY (6 TOTAL FILLS)   dutasteride (AVODART) 0.5 mg capsule Take 0.5 mg by mouth bedtime.   hydrOXYzine (ATARAX) 25 MG tablet Take 25 mg by mouth 4 (four) times a day as needed for itching.   loperamide (IMODIUM A-D) 2 mg tablet Take 2 mg by mouth every 8 (eight) hours as needed for diarrhea.   polyethylene glycol (MIRALAX) 17 gram packet Take 17 g by mouth daily.   PROPRANOLOL HCL (PROPRANOLOL ORAL) Take 20 mg by mouth 3 (three) times a day.    QUEtiapine (SEROQUEL) 100 MG tablet TAKE 1 TABLET BY  MOUTH TWICE DAILY.   sertraline (ZOLOFT) 100 MG tablet Take 1.5 tablets (150 mg total) by mouth daily.   simvastatin (ZOCOR) 20 MG tablet Take 20 mg by mouth bedtime.   ZINC OXIDE (BOUDREAUXS BUTT PASTE TOP) Apply 1 application topically every 4 (four) hours as needed (to rectal area).       Medication Plan of Care at last office visit with MD/CNP:    1.Patient will take the medications as prescribed.  Zoloft 150 mg po ever am  Seroquel 100 mg 2 times daily  Klonopin 1 mg 2 times daily

## 2021-06-25 NOTE — PATIENT INSTRUCTIONS - HE
1.Patient will take the medications as prescribed.  Zoloft 150 mg po ever am  Seroquel 100 mg 2 times daily  Klonopin 1 mg 2 times daily    2.Group home staff will call with any problems between 2 visits.  3.care home staff will take the patient to the emergency room if not feeling safe , unable to function in the community, or if suicidal, homicidal or hearing voices or having paranoia.  4.Group home staff will watch his diet and exercise.  5.Group home staff will take the patient to see non psychiatric providers for non psychiatric disorders.  6.Next appointment in 3 months.  7. Staff will take the patient to see PMD tomorrow for respiratory infection.

## 2021-06-25 NOTE — PROGRESS NOTES
Patient here today for follow up of medication management. Staff states patient is doing okay, states still has some aggression that comes and goes and forgetting a lot.

## 2021-06-25 NOTE — PROGRESS NOTES
Correct pharmacy verified with patient and confirmed in snapshot? [x] yes []no    Charge captured ? [x] yes  [] no    Medications Phoned  to Pharmacy [] yes [x]no  Name of Pharmacist:  List Medications, including dose, quantity and instructions      Medication Prescriptions given to patient   [] yes  [x] no   List the name of the drug the prescription was written for.       Medications ordered this visit were e-scribed.  Verified by order class [x] yes  [] no  Klonopin 1 mg    Medication changes or discontinuations were communicated to patient's pharmacy: [] yes  [x] no    UA collected [] yes  [x] no    [place] Prescription Monitoring Program Reviewed? [] yes  [x] no    Referrals were made to: none     Future appointment was made: [x] yes  [] no    Dictation completed at time of chart check: [x] yes  [] no    I have checked the documentation for today s encounters and the above information has been reviewed and completed.

## 2021-06-27 NOTE — PROGRESS NOTES
Progress Notes by Whitney Valle LPN at 8/14/2019 10:45 AM     Author: Whitney Valle LPN Service: -- Author Type: Licensed Nurse    Filed: 8/14/2019 11:57 AM Encounter Date: 8/14/2019 Status: Addendum    : Whitney Valle LPN (Licensed Nurse)    Related Notes: Original Note by Whitney Valle LPN (Licensed Nurse) filed at 8/14/2019 11:17 AM       Pt is here for routine follow up and accompanied by  staff. No new concerns reported today

## 2021-06-28 NOTE — PROGRESS NOTES
Progress Notes by Whitney Valle LPN at 4/1/2020 10:45 AM     Author: Whitney Valle LPN Service: -- Author Type: Licensed Nurse    Filed: 4/6/2020 10:33 AM Encounter Date: 4/1/2020 Status: Addendum    : Whitney Valle LPN (Licensed Nurse)    Related Notes: Original Note by Whitney Valle LPN (Licensed Nurse) filed at 4/1/2020  7:58 PM       Telephone visit        Medications Phoned  to Pharmacy [] yes [x]no  Name of Pharmacist:  List Medications, including dose, quantity and instructions    Medications ordered this visit were e-scribed.  Verified by order class [x] yes  [] no    Medication changes or discontinuations were communicated to patient's pharmacy: [] yes  [x] no    Future appointment was made: [] yes  [x] No  [facility] will contact the pt  Dictation completed at time of chart check: [x] yes  [] no    I have checked the documentation for todays encounters and the above information has been reviewed and completed.

## 2021-06-29 NOTE — PROGRESS NOTES
Progress Notes by Jacey Sun CMA at 9/30/2020 10:45 AM     Author: Jacey Sun CMA Service: -- Author Type: Certified Medical Assistant    Filed: 10/7/2020  2:09 PM Encounter Date: 9/30/2020 Status: Signed    : Jacey Sun CMA (Certified Medical Assistant)       This video/telephone visit will be conducted via a call between you and your physician/provider. We have found that certain health care needs can be provided without the need for an in-person physical exam. This service lets us provide the care you need with a video /telephone conversation. If a prescription is necessary we can send it directly to your pharmacy. If lab work is needed we can place an order for that and you can then stop by our lab to have the test done at a later time.   Just as we bill insurance for in-person visits, we also bill insurance for video/telephone visits. If you have questions about your insurance coverage, we recommend that you speak with your insurance company.   Patient has given verbal consent for video/Telephone visit? Yes (Per Staff [facility]ians are aware of this visit.)   Patient would like telephone visit please call: [phone]  Jacey EDGAR CMA   Per Staff Pt is ready for visit.     Staff verified patient's allergies, medications and pharmacy via phone.  Staff states patient is ready for visit.  [place]  was [place] prior to seeing patient today. See below for embedded report.

## 2021-06-30 NOTE — PROGRESS NOTES
Progress Notes by Jacey Sun CMA at 3/31/2021 10:15 AM     Author: Jacey Sun CMA Service: -- Author Type: Certified Medical Assistant    Filed: 3/31/2021 10:36 AM Encounter Date: 3/31/2021 Status: Signed    : Jacey Sun CMA (Certified Medical Assistant)       This video/telephone visit will be conducted via a call between you and your physician/provider. We have found that certain health care needs can be provided without the need for an in-person physical exam. This service lets us provide the care you need with a video /telephone conversation. If a prescription is necessary we can send it directly to your pharmacy. If lab work is needed we can place an order for that and you can then stop by our lab to have the test done at a later time.   Just as we bill insurance for in-person visits, we also bill insurance for video/telephone visits. If you have questions about your insurance coverage, we recommend that you speak with your insurance company.   Patient has given verbal consent for video/Telephone visit? Yes   Patient would like telephone visit please call: [phone]  Staff Anibal Hayes is aware that provider is running behind this morning. Will call back in no provider call by 10:30 am.     LEONCIO/FALLON EDGAR CMA   Medication refills are pended.   Patient verified allergies, medications and pharmacy via phone.  Patient states he is ready for visit.  [place]  was [place] prior to seeing patient today. See below for embedded report.

## 2021-07-04 NOTE — ADDENDUM NOTE
Addendum Note by Nanda Felder MD at 3/12/2021  1:09 PM     Author: Nanda Felder MD Service: -- Author Type: Physician    Filed: 3/12/2021  1:09 PM Encounter Date: 3/9/2021 Status: Signed    : Nanda Felder MD (Physician)    Addended by: NANDA FELDER on: 3/12/2021 01:09 PM        Modules accepted: Orders

## 2021-07-04 NOTE — ADDENDUM NOTE
Addendum Note by Lori Hernandez, RN at 3/12/2021  9:28 AM     Author: Lori Hernandez RN Service: Behavioral Author Type: Registered Nurse    Filed: 3/12/2021  9:28 AM Encounter Date: 3/9/2021 Status: Signed    : Lori Hernandez RN (Registered Nurse)    Addended by: LORI HERNANDEZ on: 3/12/2021 09:28 AM        Modules accepted: Orders

## 2021-09-09 DIAGNOSIS — F06.30 MOOD DISORDER IN CONDITIONS CLASSIFIED ELSEWHERE: ICD-10-CM

## 2021-09-09 RX ORDER — CLONAZEPAM 1 MG/1
TABLET ORAL
Qty: 62 TABLET | Refills: 5 | Status: SHIPPED | OUTPATIENT
Start: 2021-09-09 | End: 2022-02-18

## 2021-09-09 NOTE — TELEPHONE ENCOUNTER
Date of Last Office Visit: 3/31/21  Date of Next Office Visit: 9/28/21  No shows since last visit: none  Cancellations since last visit: none    Medication requested: Clonazepam  Date last ordered: 3/1/21 Qty: 60 Refills: 5     [place] of [place] ?: yes  Medication last filled date: 8/6/21 Qty filled: 60  Other controlled substance on [place] ?: no  If yes, is this a new medication?: NA  If yes, name of medication: NA and date filled: NA    Lapse in medication adherence greater than 5 days?: no  If yes, call patient and gather details: NA  Medication refill request verified as identical to current order?: yes  Result of Last DAM, VPA, Li+ Level, CBC, or Carbamazepine Level (at or since last visit): N/A    []Medication refilled per  Medication Refill in Ambulatory Care  policy.  [x]Medication unable to be refilled by RN due to criteria not met as indicated below:    []Eligibility - not seen in the last year   []Supervision - no future appointment   []Compliance - no shows, cancellations or lapse in therapy   []Verification - order discrepancy   [x]Controlled medication   []Medication not included in policy   []90-day supply request   [x]Other (multiple month) request

## 2021-09-28 ENCOUNTER — OFFICE VISIT (OUTPATIENT)
Dept: BEHAVIORAL HEALTH | Facility: CLINIC | Age: 61
End: 2021-09-28
Payer: MEDICARE

## 2021-09-28 VITALS — HEART RATE: 61 BPM | DIASTOLIC BLOOD PRESSURE: 73 MMHG | SYSTOLIC BLOOD PRESSURE: 115 MMHG

## 2021-09-28 DIAGNOSIS — F06.30 MOOD DISORDER DUE TO OLD HEAD TRAUMA: Primary | ICD-10-CM

## 2021-09-28 DIAGNOSIS — F02.818 DEMENTIA DUE TO HEAD TRAUMA WITH BEHAVIORAL DISTURBANCE (H): ICD-10-CM

## 2021-09-28 DIAGNOSIS — Z79.899 ENCOUNTER FOR LONG-TERM (CURRENT) USE OF MEDICATIONS: ICD-10-CM

## 2021-09-28 DIAGNOSIS — F31.9 BIPOLAR 1 DISORDER, DEPRESSED (H): ICD-10-CM

## 2021-09-28 DIAGNOSIS — F60.9 PERSONALITY DISORDER (H): ICD-10-CM

## 2021-09-28 DIAGNOSIS — S09.90XS MOOD DISORDER DUE TO OLD HEAD TRAUMA: Primary | ICD-10-CM

## 2021-09-28 DIAGNOSIS — S09.90XS DEMENTIA DUE TO HEAD TRAUMA WITH BEHAVIORAL DISTURBANCE (H): ICD-10-CM

## 2021-09-28 PROCEDURE — 99207 PR CDG-MDM COMPONENT: MEETS MODERATE - UP CODED: CPT | Performed by: PSYCHIATRY & NEUROLOGY

## 2021-09-28 PROCEDURE — 99214 OFFICE O/P EST MOD 30 MIN: CPT | Performed by: PSYCHIATRY & NEUROLOGY

## 2021-09-28 PROCEDURE — G0463 HOSPITAL OUTPT CLINIC VISIT: HCPCS

## 2021-09-28 NOTE — PATIENT INSTRUCTIONS
1.Patient will take the medications as prescribed.   Zoloft 150 mg po ever am for mood  Seroquel 100 mg 2 times daily for mood  Klonopin 1 mg 2 times daily for anxiety and agitation  Propranolol 20 mg 3 times daily for anxiety  Patient will not stop taking medications or adjust them without consulting with the provider.  2.Group home staff will call with any problems between 2 visits.  3.residential staff will take the patient to the emergency room if not feeling safe , unable to function in the community, or if suicidal, homicidal or hearing voices or having paranoia.  4.Group home staff will watch his diet and exercise.  5.Group home staff will take the patient to see non psychiatric providers for non psychiatric disorders.  6.Next appointment in 6 months.  7.  Check fasting lipids and glucose for monitoring Seroquel
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
## 2021-09-28 NOTE — NURSING NOTE
Pt is here for routine follow up. Staff reports no change DISCUS score is a 0 today    Correct pharmacy verified with patient and confirmed in snapshot? [x] yes []no    Charge captured ? [x] yes  [] no    Medications Phoned  to Pharmacy [] yes [x]no  Name of Pharmacist:  List Medications, including dose, quantity and instructions      Medication Prescriptions given to patient   [] yes  [x] no   List the name of the drug the prescription was written for.       Medications ordered this visit were e-scribed.  Verified by order class [] yes  [x] no    Medication changes or discontinuations were communicated to patient's pharmacy: [] yes  [x] no    UA collected [] yes  [x] no    [place] Prescription Monitoring Program Reviewed? [] yes  [x] no    Referrals were made to:  none  Future appointment was made: [] yes  [x] no    Dictation completed at time of chart check: [] yes  [x] no    I have checked the documentation for today s encounters and the above information has been reviewed and completed.

## 2021-10-05 ENCOUNTER — TRANSFERRED RECORDS (OUTPATIENT)
Dept: HEALTH INFORMATION MANAGEMENT | Facility: CLINIC | Age: 61
End: 2021-10-05
Payer: MEDICARE

## 2021-10-11 ENCOUNTER — TELEPHONE (OUTPATIENT)
Dept: BEHAVIORAL HEALTH | Facility: HOSPITAL | Age: 61
End: 2021-10-11
Payer: MEDICARE

## 2021-10-29 ENCOUNTER — TELEPHONE (OUTPATIENT)
Dept: BEHAVIORAL HEALTH | Facility: HOSPITAL | Age: 61
End: 2021-10-29

## 2022-01-03 NOTE — TELEPHONE ENCOUNTER
As per provider's directive, lab reports faxed to [facility] to set up an appt with PCP to follow up on increased glucose level.

## 2022-02-14 ENCOUNTER — TELEPHONE (OUTPATIENT)
Dept: BEHAVIORAL HEALTH | Facility: CLINIC | Age: 62
End: 2022-02-14
Payer: MEDICARE

## 2022-02-14 NOTE — TELEPHONE ENCOUNTER
[facility] ([facility] (TC):     NURSING Bridging Transfer of care from Dr. Yeh to new longitudinal provider  _________________________________    This RN has consulted with provider & this Medication Management referral to the Transition Clinic is deemed   [x] Appropriate  [] Inappropriate     Based on the following additional information gathered: Pt of Dr. Yeh transferring to long-term provider, bridging care until new provider; was scheduled w/ Dr. Yeh 3/29/22 at 11:45am; will likely need refill on/before 3/12/22    Contact w/ patient: This RN called pt, spoke to caregiver, reviewed provider leaving clinic and they verbalized understanding. We scheduled for them to be seen in the Transition Clinic w/ YENNY Espinal on 3/29/22 at 10:30am in-person. For long-term outpt psych appt schedule at check-out. Wtr reviewed location of clinic. Wtr routed to [facility] to facilitate scheduling.    Zeina Lozano RN on February 14, 2022 at 1:08 PM

## 2022-04-11 ENCOUNTER — TELEPHONE (OUTPATIENT)
Dept: PSYCHIATRY | Facility: CLINIC | Age: 62
End: 2022-04-11
Payer: MEDICARE

## 2022-04-11 NOTE — TELEPHONE ENCOUNTER
Reason for Call:   medication refill:    Do you use a [facility]?  Name of the pharmacy and phone number for the current request:  [facility]    Name of the medication requested: Clonozapam    Other request:  called stating that Dr. Rosales would be the one doing refills of patients Clonazepam. Pt previous Dr. Yeh patient, but no information found in chart regarding Ileana Rosales taking over medication regamine.     Can we leave a detailed message on this number? YES    Phone number patient can be reached at: Home number on file [phone] (home) or Cell number on file:    No relevant phone numbers on file.       Best Time: any    Call taken on 4/11/2022 at 9:10 AM by Laron Pierre

## 2022-04-11 NOTE — TELEPHONE ENCOUNTER
Received call from Jacek Kennedy () regarding getting a Clonazepam prescription refilled.  Patient had followed with Dr. Yeh in the past. Patient had a [facility] appt on 3/29/22 with LINO Espinal, however patient did not attend this visit.  Jacek was asking if Dr. Yeh or Ileana Rosales could refill this prescription.  Writer instructed him that Dr. Yeh was no longer working in the clinic and that Ning Rosales prescribed that mediation only as a bridge of care.  Writer asked if the patient has established care with a new outpatient Provider.  Jacek Kennedy reported that an appointment had been made for 4/18/22 with [facility] with Provider Dr. Rico. Jacek Kennedy was instructed that all future medication management would be done with this new Provider.  He understood and will be calling the [facility] to review this refill request with new care team.  Jacek Kennedy was also instructed to call the Transition Clinic as another option to bridge care from now until his appointment on the 18th.  Jacek Kennedy was given the number for the Transition Clinic [phone].

## 2022-04-11 NOTE — TELEPHONE ENCOUNTER
Jacek Kennedy,  staff called back stating Tyler (nurse?) had told him to call back.  He acknowledges that Bobby never saw this pt, but he does need a refill of medication and has not established care with a new provider yet.  Please call staff at [phone]  They would like it sent to [facility] pharmacy.

## 2022-06-10 NOTE — TELEPHONE ENCOUNTER
Reason for Call:  Medication or medication refill:rcvd call from Regina @ [facility] pharm   stating that they are req a 60 day supply of :   clonazePAM (KLONOPIN) 1 MG tablet    That order was for 30 day.     Do you use a [facility]?  Name of the pharmacy and phone number for the current request:   [facility]   Telephone Fax   [phone]   Pharmacy Address and Hours    Address Hours   [address] [facility] [address]          Name of the medication requested:clonazePAM (KLONOPIN) 1 MG tablet    Other request: they are req call back with clarification    Can we leave a detailed message on this number? Yes    Phone number patient can be reached at: Other phone number:  [phone]    Best Time: any    Call taken on 3/5/2021 at 1:35 PM by Ranjit Robison     dementia